# Patient Record
Sex: MALE | URBAN - METROPOLITAN AREA
[De-identification: names, ages, dates, MRNs, and addresses within clinical notes are randomized per-mention and may not be internally consistent; named-entity substitution may affect disease eponyms.]

---

## 2020-07-29 ENCOUNTER — HOSPITAL ENCOUNTER (OUTPATIENT)
Dept: LAB | Age: 60
Discharge: HOME | End: 2020-07-29

## 2020-07-29 PROCEDURE — 88305 TISSUE EXAM BY PATHOLOGIST: CPT

## 2020-07-29 PROCEDURE — 88312 SPECIAL STAINS GROUP 1: CPT

## 2024-01-31 NOTE — PROGRESS NOTES
Name: Mark Sheriff  YOB: 1960  Gender: male  MRN: [id number]      CC: Shoulder Pain (L)       HPI: Mark Sheriff is a 63 y.o. male who returns for follow up on  his left shoulder. He reports that he is doing excellent. The shot gave him about 95% relief.       Physical Examination:  General: no acute distress  Lungs: breathing easily  CV: regular rhythm by pulse  Left Shoulder: Full passive and active range of motion no significant discomfort with impingement testing today 5 out of 5 rotator cuff strength.        Assessment:     ICD-10-CM    1. Impingement syndrome of left shoulder  M75.42           Plan:   He is doing much better he admits he has not been doing the home exercise program.  I do think he should be more consistent with that we discussed this today and the details of rotator cuff strengthening at home.  He expressed understanding.  He can follow-up as needed if he has return of symptoms.            Naveen Topete MD, FAAOS  Orthopaedics and Sports Medicine

## 2024-02-01 ENCOUNTER — OFFICE VISIT (OUTPATIENT)
Dept: ORTHOPEDIC SURGERY | Age: 64
End: 2024-02-01
Payer: COMMERCIAL

## 2024-02-01 DIAGNOSIS — M75.42 IMPINGEMENT SYNDROME OF LEFT SHOULDER: Primary | ICD-10-CM

## 2024-02-01 PROCEDURE — 99213 OFFICE O/P EST LOW 20 MIN: CPT | Performed by: ORTHOPAEDIC SURGERY

## 2024-02-01 PROCEDURE — 1123F ACP DISCUSS/DSCN MKR DOCD: CPT | Performed by: ORTHOPAEDIC SURGERY

## 2025-08-14 ENCOUNTER — TRANSCRIBE ORDERS (OUTPATIENT)
Dept: SCHEDULING | Age: 65
End: 2025-08-14

## 2025-08-14 DIAGNOSIS — Z13.6 ENCOUNTER FOR SCREENING FOR CARDIOVASCULAR DISORDERS: Primary | ICD-10-CM

## 2025-08-14 DIAGNOSIS — Z87.891 HISTORY OF NICOTINE DEPENDENCE: ICD-10-CM

## 2025-08-22 ENCOUNTER — HOSPITAL ENCOUNTER (OUTPATIENT)
Dept: ULTRASOUND IMAGING | Age: 65
Discharge: HOME | End: 2025-08-25
Payer: MEDICARE

## 2025-08-22 DIAGNOSIS — Z13.6 ENCOUNTER FOR SCREENING FOR CARDIOVASCULAR DISORDERS: ICD-10-CM

## 2025-08-22 DIAGNOSIS — Z87.891 HISTORY OF NICOTINE DEPENDENCE: ICD-10-CM

## 2025-08-22 PROCEDURE — 76706 US ABDL AORTA SCREEN AAA: CPT

## 2025-08-22 PROCEDURE — 76706 US ABDL AORTA SCREEN AAA: CPT | Performed by: RADIOLOGY
